# Patient Record
(demographics unavailable — no encounter records)

---

## 2024-10-14 NOTE — HISTORY OF PRESENT ILLNESS
[FreeTextEntry1] : Starting about 3-4 days ago, patient started  having hallucinations -seeing things in her room very confused and paranoid  recently stopped metformin and aricept and calcium which greatly resolved her appetite and GI problems  no n/v/d or constipation no fever, no shortness of breath, no chest pain no palpitations  does have lower back pain slightly worse takes tylenol in the am  recently started midodrine 2.5 mg tid due to orthostatic hypotension as per Dr. Ananda Dwyer - cardiologist [facility]

## 2024-10-14 NOTE — ASSESSMENT
[FreeTextEntry1] : take 2 tabs tylenol bid for back pain  still w orthostatic hypotension as measured in office by writer continue midodrine as per cardiologist  confusion hallucinations and paranoia may be progression of dementia but will check labs and urine today  f/u with pcp on 10/17 as scheduled

## 2024-10-14 NOTE — REASON FOR VISIT
[Acute] : an acute visit [Family Member] : family member [FreeTextEntry1] : confusion [FreeTextEntry3] : oral jaime

## 2024-10-17 NOTE — HISTORY OF PRESENT ILLNESS
[Patient reported osteoporosis screening was normal] : Patient reported osteoporosis screening was normal [Patient reported hearing was normal] : Patient reported hearing was normal [Patient reported vision is normal] : Patient reported vision is normal [Patient reported colon/rectal/cancer screening was normal] : Patient reported colon/rectal cancer screening was normal [Patient reported breast cancer screening was normal] : Patient reported breast cancer screening was normal [FreeTextEntry1] : Patient is a 81-year-old female with past medical history of mild dementia, A-fib, B12 deficiency, depression, CAD s/p 3 stents and a loop recorder, unintentional weight loss.  10/17/2024 Back Pain - Reports that the back pain seems to be getting less and improving - Experiences pain after physical therapy sessions - Pain is more manageable now compared to previous instances - Pain is not constant, goes through spurts - Pain seems to be a trigger for confusion episodes  Mood and Mental Health - Has been crying a lot and having days where she wakes up upset - Sleeps for about 10-11 hours, uses sleep as an escape from depression - Reports hallucinations and worsening confusion in the past couple of weeks - Reports feeling like she has been moved from three places and constantly looking for differences between them - Reports seeing things which are not there, such as a dog sitting next to her - Reports feeling really confused and disoriented, even in her own apartment - Reports feelings of paranoia and depression - Reports lack of relief when feeling uptight - Expresses feelings of hopelessness  Weight and Appetite - Weight has increased since last visit, currently weighs 111 lbs - Reports an increase in appetite  Medication and Treatment - Recently started on midodrine 2.5 mg three times a day for orthostatic hypotension, but no improvement noticed yet - Baby aspirin was recently stopped by cardiologist - metformin, calcium, and Aricept were stopped previously - Open to adding another antidepressant medication  Physical Therapy - Was attending physical therapy twice a week but stopped due to increased confusion and upset days - Finds physical therapy painful and not helpful - Interested in different types of therapy such as massage or bike exercises  Urinary Health - No burning sensation during urination - No increased frequency of urination    4AT score:  Burning urination: no Urine/fecal symptoms:  Medications: new midodrine withdrawl: no Pain: no; getting better Depression screen: no Sleep: less; 10-11hr Sensory deficits:   Level of activity:  Hydration: 32oz; not always Nutrition: increasing weight PE/MI:    Will check BMP,     09/06/2024 constipation: resolved ; taking miralax feels more depressed. tapered off fluoxetine; started lexapro 5mg 1 month ago.  she lives in Independent living.   eating very well now. now on 107lb. off donepezil, calcium, metformin. doing ensure once a day. balance is stil bit off. would try PT. hold off on memantine  seeing cardiologist next month. will discuss if aspirin can be discontinued.  orthostatic hypotension  aug 30,2024 Patient has not been feeling well since last 1 week not eating and drinking.  3 days ago she tested positive for COVID.  She also dropped off a urine sample yesterday as per daughter.  She has been feeling well better since yesterday afternoon and drinking 1 protein shake a day.  She had fever 100.3 on Monday but then it went away.  She also had runny nose which has gotten better.  Rarely has any cough but no sputum production.  Using Tylenol for headache and back pain. She has not had a bowel movement since Sunday.  Her labs from July showed a GFR of 41 and A1c of 6.2.  She is now off of fluoxetine and donepezil.  Has been taking Lexapro since last 1 week 5 mg daily.  SHOAIB HERBERT is a 81 year yo White female is coming in today to establish care. Patient has PMHx as listed below    #Depression long standing; was on fluoxetine 80mg and now on 60mg QD. 40 in AM and 20 in evening.  Sleeps 10-12hrs. always been a good sleeper.  #CAD s/p 3 stents with loop recorder on ASA + famotidine 20mg BID  #Afib on eliquis 2.5mg  BID, digoxin, not on metoprolol.  #B12 def on B12 3x a week  #Mild dementia on donepezil 5mg QD; will stop 2/2 weight loss  #unintentional weightloss started boost and ensure; using as needed.   did PT and stopped 1 month ago.    Education:[ ] Work:[ ] ETOH/Smoking:[ ] Weight loss/malnutrition:[ ] :[ ] Immunization: Screening: Depression screening: Medication reconciliation: Allergies:     4M Geriatric Screening Tests   Based on The 4Ms While Caring for Older Adults, an evidence-based focus on the key areas of mentation, mobility, medications, and what matters most (a guide by the [place] for [employer] and the [employer])     Medications: -Anticholinergic burden:[ ]     Mobility:   -Chronic pain:[ ] -Constipation: -Urinary symptoms: IPSS score: Is prostate >30cc?, PSA >1.5? sexually active?     Frail Scale: 0/5   -Are you fatigued?[ ] -Can you walk up one flight of stairs?[ ] -Can you walk one block?[ ] -Do you have more than 5 illnesses?[ ] -Have you lost more than 5% of your weight in last 6 months?[ ]       Mentation: -Hx of dementia:[ ] -h/o delirium:[ ] -Cognitive enhancing agents:[ ]       Sleep: STOP-BANG Snoring:[ ] Tiredness:[ ] Observed Apnea:[ ] Pressure: High blood pressure[ ] BMI: higher than 35.[ ] Age: older than 50[ ] Neck Circumference: greater than 16 inches is considered a risk factor.[ ] Gender: Males[ ]     Matters Most     [TextBox_25] : due [TextBox_37] : needs [TextBox_19] : done [FreeTextEntry4] : would defer for now

## 2024-10-17 NOTE — HISTORY OF PRESENT ILLNESS
[Patient reported osteoporosis screening was normal] : Patient reported osteoporosis screening was normal [Patient reported hearing was normal] : Patient reported hearing was normal [Patient reported vision is normal] : Patient reported vision is normal [Patient reported colon/rectal/cancer screening was normal] : Patient reported colon/rectal cancer screening was normal [Patient reported breast cancer screening was normal] : Patient reported breast cancer screening was normal [FreeTextEntry1] : Patient is a 81-year-old female with past medical history of mild dementia, A-fib, B12 deficiency, depression, CAD s/p 3 stents and a loop recorder, unintentional weight loss.  10/17/2024 Back Pain - Reports that the back pain seems to be getting less and improving - Experiences pain after physical therapy sessions - Pain is more manageable now compared to previous instances - Pain is not constant, goes through spurts - Pain seems to be a trigger for confusion episodes  Mood and Mental Health - Has been crying a lot and having days where she wakes up upset - Sleeps for about 10-11 hours, uses sleep as an escape from depression - Reports hallucinations and worsening confusion in the past couple of weeks - Reports feeling like she has been moved from three places and constantly looking for differences between them - Reports seeing things which are not there, such as a dog sitting next to her - Reports feeling really confused and disoriented, even in her own apartment - Reports feelings of paranoia and depression - Reports lack of relief when feeling uptight - Expresses feelings of hopelessness  Weight and Appetite - Weight has increased since last visit, currently weighs 111 lbs - Reports an increase in appetite  Medication and Treatment - Recently started on midodrine 2.5 mg three times a day for orthostatic hypotension, but no improvement noticed yet - Baby aspirin was recently stopped by cardiologist - metformin, calcium, and Aricept were stopped previously - Open to adding another antidepressant medication  Physical Therapy - Was attending physical therapy twice a week but stopped due to increased confusion and upset days - Finds physical therapy painful and not helpful - Interested in different types of therapy such as massage or bike exercises  Urinary Health - No burning sensation during urination - No increased frequency of urination    4AT score:  Burning urination: no Urine/fecal symptoms:  Medications: new midodrine withdrawl: no Pain: no; getting better Depression screen: no Sleep: less; 10-11hr Sensory deficits:   Level of activity:  Hydration: 32oz; not always Nutrition: increasing weight PE/MI:    Will check BMP,     09/06/2024 constipation: resolved ; taking miralax feels more depressed. tapered off fluoxetine; started lexapro 5mg 1 month ago.  she lives in Independent living.   eating very well now. now on 107lb. off donepezil, calcium, metformin. doing ensure once a day. balance is stil bit off. would try PT. hold off on memantine  seeing cardiologist next month. will discuss if aspirin can be discontinued.  orthostatic hypotension  aug 30,2024 Patient has not been feeling well since last 1 week not eating and drinking.  3 days ago she tested positive for COVID.  She also dropped off a urine sample yesterday as per daughter.  She has been feeling well better since yesterday afternoon and drinking 1 protein shake a day.  She had fever 100.3 on Monday but then it went away.  She also had runny nose which has gotten better.  Rarely has any cough but no sputum production.  Using Tylenol for headache and back pain. She has not had a bowel movement since Sunday.  Her labs from July showed a GFR of 41 and A1c of 6.2.  She is now off of fluoxetine and donepezil.  Has been taking Lexapro since last 1 week 5 mg daily.  SHOAIB HERBERT is a 81 year yo White female is coming in today to establish care. Patient has PMHx as listed below    #Depression long standing; was on fluoxetine 80mg and now on 60mg QD. 40 in AM and 20 in evening.  Sleeps 10-12hrs. always been a good sleeper.  #CAD s/p 3 stents with loop recorder on ASA + famotidine 20mg BID  #Afib on eliquis 2.5mg  BID, digoxin, not on metoprolol.  #B12 def on B12 3x a week  #Mild dementia on donepezil 5mg QD; will stop 2/2 weight loss  #unintentional weightloss started boost and ensure; using as needed.   did PT and stopped 1 month ago.    Education:[ ] Work:[ ] ETOH/Smoking:[ ] Weight loss/malnutrition:[ ] :[ ] Immunization: Screening: Depression screening: Medication reconciliation: Allergies:     4M Geriatric Screening Tests   Based on The 4Ms While Caring for Older Adults, an evidence-based focus on the key areas of mentation, mobility, medications, and what matters most (a guide by the [place] [employer] and the [employer] [place])     Medications: -Anticholinergic burden:[ ]     Mobility:   -Chronic pain:[ ] -Constipation: -Urinary symptoms: IPSS score: Is prostate >30cc?, PSA >1.5? sexually active?     Frail Scale: 0/5   -Are you fatigued?[ ] -Can you walk up one flight of stairs?[ ] -Can you walk one block?[ ] -Do you have more than 5 illnesses?[ ] -Have you lost more than 5% of your weight in last 6 months?[ ]       Mentation: -Hx of dementia:[ ] -h/o delirium:[ ] -Cognitive enhancing agents:[ ]       Sleep: STOP-BANG Snoring:[ ] Tiredness:[ ] Observed Apnea:[ ] Pressure: High blood pressure[ ] BMI: higher than 35.[ ] Age: older than 50[ ] Neck Circumference: greater than 16 inches is considered a risk factor.[ ] Gender: Males[ ]     Matters Most     [TextBox_25] : due [TextBox_37] : needs [TextBox_19] : done [FreeTextEntry4] : would defer for now

## 2024-10-21 NOTE — HISTORY OF PRESENT ILLNESS
[FreeTextEntry1] : Lives with spouse at the [facility] in [place] in the independent living and requires assistance with ADLs and IADLs currently applying for medicaid and requires paperwork  [2] : 2) Feeling down, depressed, or hopeless for more than half of the days (2) [I have developed a follow-up plan documented below in the note.] : I have developed a follow-up plan documented below in the note. [[id number]] : 4

## 2024-10-21 NOTE — PHYSICAL EXAM
[Alert] : alert [No Acute Distress] : in no acute distress [Normal Outer Ear/Nose] : the ears and nose were normal in appearance [Normal Appearance] : the appearance of the neck was normal [Supple] : the neck was supple [No Respiratory Distress] : no respiratory distress [No Acc Muscle Use] : no accessory muscle use [Respiration, Rhythm And Depth] : normal respiratory rhythm and effort [Auscultation Breath Sounds / Voice Sounds] : lungs were clear to auscultation bilaterally [Heart Rate And Rhythm] : heart rate was normal and rhythm regular [Normal Color / Pigmentation] : normal skin color and pigmentation [Normal Turgor] : normal skin turgor [No Focal Deficits] : no focal deficits [Normal S1, S2] : normal S1 and S2 [Motor Exam] : the motor exam was normal

## 2024-10-21 NOTE — REVIEW OF SYSTEMS
[Constipation] : constipation [Negative] : Musculoskeletal [FreeTextEntry2] : dizziness when she stands up [FreeTextEntry7] : miralax as needed

## 2024-10-21 NOTE — ASSESSMENT
[FreeTextEntry1] : still feeling sadness but no SI due to confusion and changes has not started Sertraline yet; will start today lives with spouse very supportive daughter  Filled out forms as requested for application to Medicaid pt requires assistance with many activities due to dementia,  orthostatic hypotension and history of/risk for falls

## 2025-01-14 NOTE — HISTORY OF PRESENT ILLNESS
[Home] : at home, [unfilled] , at the time of the visit. [Medical Office: ([facility])___] : at the medical office located in  [Family Member] : family member [FreeTextEntry3] : Daughter Hayde [FreeTextEntry1] : Patient is a 81-year-old female with past medical history of mild dementia, A-fib, B12 deficiency, depression, CAD s/p 3 stents and a loop recorder, unintentional weight loss. 01/14/2024   - History of Present Illness: Karime, an elderly female patient, has been experiencing increasingly severe delusions and sleep disturbances. She has been living in independent living at [facility] for two years. Recently, her symptoms have worsened significantly. She is up all night, confused about her location, paranoid, and angry. She believes she is being moved to different warehouses nightly and that people are stealing her belongings. The patient now requires 24-hour care due to the severity of her symptoms. Rexulti was previously prescribed but has not shown significant improvement. Initially, it caused morning drowsiness and dry mouth. The patient experienced a fall resulting in two broken ribs while on Rexulti. It is also very expensive.  Sleep patterns have deteriorated, with the patient only getting short periods of sleep (about an hour at a time) throughout the night. The patient has become hyper due to lack of sleep, which is unusual as she typically napped frequently during the day.      - Miralax three times a week for constipation management  12/05/2024 Confusion and memory issues - Experiences confusion and difficulty with memory, including trouble finding words and using incorrect words. - Episodes of confusion have been on and off, with increased frequency this week. - Does not remember episodes of confusion. - Underwent memory testing during the visit.  Orthostatic hypotension - Experiences orthostatic hypotension, with significant drops in blood pressure. - Currently taking midodrine, but it does not seem effective.  Mood and emotional state - Mood has improved with medication adjustments, less emotional and calmer in the past week or two. - Aware of confusion, which is upsetting. - Does not feel depressed currently, but gets overwhelmed easily.  Vision problems - Occasionally experiences cloudy vision in the left eye. - Last eye exam was two months ago (October 2024), which showed longstanding diabetes and heart issues but otherwise good eye health.  Physical and occupational therapy - Attends physical therapy for balance and strengthening. - Participates in occupational therapy, focusing on upper body and fine motor skills.  Living situation and daily assistance - Resides at [facility] with her . - Has a private pay aide seven days a week for assistance with daily activities due to instability and confusion.  Falls and balance issues - Has experienced falls, including a recent fall by the window, but has not been injured. Advanced care planning - Has not completed the pink form for advanced care planning. - Has legal documents for a living will but needs to complete healthcare proxy forms.  Cognitive careplan checklist summary: [facility] Memory assessment: 5/30 ADLs/IADLs: done PHQ-2, NPI-Q: done Med rec: done Home Safety: done Caregiver assessment: done ACP documentation: will fill at next visit FAST score: 6d Decision-making capacity: not intact for complex decisions  doing PT and OT          10/17/2024 Back Pain - Reports that the back pain seems to be getting less and improving - Experiences pain after physical therapy sessions - Pain is more manageable now compared to previous instances - Pain is not constant, goes through spurts - Pain seems to be a trigger for confusion episodes  Mood and Mental Health - Has been crying a lot and having days where she wakes up upset - Sleeps for about 10-11 hours, uses sleep as an escape from depression - Reports hallucinations and worsening confusion in the past couple of weeks - Reports feeling like she has been moved from three places and constantly looking for differences between them - Reports seeing things which are not there, such as a dog sitting next to her - Reports feeling really confused and disoriented, even in her own apartment - Reports feelings of paranoia and depression - Reports lack of relief when feeling uptight - Expresses feelings of hopelessness  Weight and Appetite - Weight has increased since last visit, currently weighs 111 lbs - Reports an increase in appetite  Medication and Treatment - Recently started on midodrine 2.5 mg three times a day for orthostatic hypotension, but no improvement noticed yet - Baby aspirin was recently stopped by cardiologist - metformin, calcium, and Aricept were stopped previously - Open to adding another antidepressant medication  Physical Therapy - Was attending physical therapy twice a week but stopped due to increased confusion and upset days - Finds physical therapy painful and not helpful - Interested in different types of therapy such as massage or bike exercises  Urinary Health - No burning sensation during urination - No increased frequency of urination    4AT score:  Burning urination: no Urine/fecal symptoms:  Medications: new midodrine withdrawl: no Pain: no; getting better Depression screen: no Sleep: less; 10-11hr Sensory deficits:   Level of activity:  Hydration: 32oz; not always Nutrition: increasing weight PE/MI:    Will check BMP,     09/06/2024 constipation: resolved ; taking miralax feels more depressed. tapered off fluoxetine; started lexapro 5mg 1 month ago.  she lives in Independent living.   eating very well now. now on 107lb. off donepezil, calcium, metformin. doing ensure once a day. balance is stil bit off. would try PT. hold off on memantine  seeing cardiologist next month. will discuss if aspirin can be discontinued.  orthostatic hypotension  aug 30,2024 Patient has not been feeling well since last 1 week not eating and drinking.  3 days ago she tested positive for COVID.  She also dropped off a urine sample yesterday as per daughter.  She has been feeling well better since yesterday afternoon and drinking 1 protein shake a day.  She had fever 100.3 on Monday but then it went away.  She also had runny nose which has gotten better.  Rarely has any cough but no sputum production.  Using Tylenol for headache and back pain. She has not had a bowel movement since Sunday.  Her labs from July showed a GFR of 41 and A1c of 6.2.  She is now off of fluoxetine and donepezil.  Has been taking Lexapro since last 1 week 5 mg daily.  SHOAIB HERBERT is a 81 year yo White female is coming in today to establish care. Patient has PMHx as listed below    #Depression long standing; was on fluoxetine 80mg and now on 60mg QD. 40 in AM and 20 in evening.  Sleeps 10-12hrs. always been a good sleeper.  #CAD s/p 3 stents with loop recorder on ASA + famotidine 20mg BID  #Afib on eliquis 2.5mg  BID, digoxin, not on metoprolol.  #B12 def on B12 3x a week  #Mild dementia on donepezil 5mg QD; will stop 2/2 weight loss  #unintentional weightloss started boost and ensure; using as needed.   did PT and stopped 1 month ago.    Education:[ ] Work:[ ] ETOH/Smoking:[ ] Weight loss/malnutrition:[ ] :[ ] Immunization: Screening: Depression screening: Medication reconciliation: Allergies:     4M Geriatric Screening Tests   Based on The 4Ms While Caring for Older Adults, an evidence-based focus on the key areas of mentation, mobility, medications, and what matters most (a guide by the [place] for Healthcare [employer] and the [employer] [place] [employer])     Medications: -Anticholinergic burden:[ ]     Mobility:   -Chronic pain:[ ] -Constipation: -Urinary symptoms: IPSS score: Is prostate >30cc?, PSA >1.5? sexually active?     Frail Scale: 0/5   -Are you fatigued?[ ] -Can you walk up one flight of stairs?[ ] -Can you walk one block?[ ] -Do you have more than 5 illnesses?[ ] -Have you lost more than 5% of your weight in last 6 months?[ ]       Mentation: -Hx of dementia:[ ] -h/o delirium:[ ] -Cognitive enhancing agents:[ ]       Sleep: STOP-BANG Snoring:[ ] Tiredness:[ ] Observed Apnea:[ ] Pressure: High blood pressure[ ] BMI: higher than 35.[ ] Age: older than 50[ ] Neck Circumference: greater than 16 inches is considered a risk factor.[ ] Gender: Males[ ]     Matters Most

## 2025-02-06 NOTE — HISTORY OF PRESENT ILLNESS
[FreeTextEntry1] : Patient is a 81-year-old female with past medical history of mild dementia, A-fib, B12 deficiency, depression, CAD s/p 3 stents and a loop recorder, unintentional weight loss. 02/06/2025 history of recent stroke, presents with a sudden onset of confusion and decreased responsiveness. The symptoms began two days ago, with the patient experiencing difficulty in talking and increased confusion. The condition worsened significantly over the past 24 hours, with the patient becoming almost non-verbal and appearing uncomfortable. The patient's daughter reports that while there has been a gradual decline in her mother's condition, this acute change is unprecedented. The patient had an MRI last Saturday that revealed a small stroke, following a blood clot in her eye the week prior. Since then, her medication regimen was adjusted to include Eliquis 2.5 mg twice daily and a daily baby aspirin. The patient takes Seroquel 25 mg at night for management of confusion and agitation. There is no reported fever, but the patient feels cold to touch. The daughter also mentions a single instance of noticing a small amount of bright red blood on toilet paper after the patient used the bathroom on Saturday, but no recurrence since then.   01/14/2024   - History of Present Illness: Karime, an elderly female patient, has been experiencing increasingly severe delusions and sleep disturbances. She has been living in independent living at [place] for two years. Recently, her symptoms have worsened significantly. She is up all night, confused about her location, paranoid, and angry. She believes she is being moved to different warehouses nightly and that people are stealing her belongings. The patient now requires 24-hour care due to the severity of her symptoms. Rexulti was previously prescribed but has not shown significant improvement. Initially, it caused morning drowsiness and dry mouth. The patient experienced a fall resulting in two broken ribs while on Rexulti. It is also very expensive.  Sleep patterns have deteriorated, with the patient only getting short periods of sleep (about an hour at a time) throughout the night. The patient has become hyper due to lack of sleep, which is unusual as she typically napped frequently during the day.      - Miralax three times a week for constipation management  12/05/2024 Confusion and memory issues - Experiences confusion and difficulty with memory, including trouble finding words and using incorrect words. - Episodes of confusion have been on and off, with increased frequency this week. - Does not remember episodes of confusion. - Underwent memory testing during the visit.  Orthostatic hypotension - Experiences orthostatic hypotension, with significant drops in blood pressure. - Currently taking midodrine, but it does not seem effective.  Mood and emotional state - Mood has improved with medication adjustments, less emotional and calmer in the past week or two. - Aware of confusion, which is upsetting. - Does not feel depressed currently, but gets overwhelmed easily.  Vision problems - Occasionally experiences cloudy vision in the left eye. - Last eye exam was two months ago (October 2024), which showed longstanding diabetes and heart issues but otherwise good eye health.  Physical and occupational therapy - Attends physical therapy for balance and strengthening. - Participates in occupational therapy, focusing on upper body and fine motor skills.  Living situation and daily assistance - Resides at [facility] with her . - Has a private pay aide seven days a week for assistance with daily activities due to instability and confusion.  Falls and balance issues - Has experienced falls, including a recent fall by the window, but has not been injured. Advanced care planning - Has not completed the pink form for advanced care planning. - Has legal documents for a living will but needs to complete healthcare proxy forms.  Cognitive careplan checklist summary: [facility] Memory assessment: 5/30 ADLs/IADLs: done PHQ-2, NPI-Q: done Med rec: done Home Safety: done Caregiver assessment: done ACP documentation: will fill at next visit FAST score: 6d Decision-making capacity: not intact for complex decisions  doing PT and OT          10/17/2024 Back Pain - Reports that the back pain seems to be getting less and improving - Experiences pain after physical therapy sessions - Pain is more manageable now compared to previous instances - Pain is not constant, goes through spurts - Pain seems to be a trigger for confusion episodes  Mood and Mental Health - Has been crying a lot and having days where she wakes up upset - Sleeps for about 10-11 hours, uses sleep as an escape from depression - Reports hallucinations and worsening confusion in the past couple of weeks - Reports feeling like she has been moved from three places and constantly looking for differences between them - Reports seeing things which are not there, such as a dog sitting next to her - Reports feeling really confused and disoriented, even in her own apartment - Reports feelings of paranoia and depression - Reports lack of relief when feeling uptight - Expresses feelings of hopelessness  Weight and Appetite - Weight has increased since last visit, currently weighs 111 lbs - Reports an increase in appetite  Medication and Treatment - Recently started on midodrine 2.5 mg three times a day for orthostatic hypotension, but no improvement noticed yet - Baby aspirin was recently stopped by cardiologist - metformin, calcium, and Aricept were stopped previously - Open to adding another antidepressant medication  Physical Therapy - Was attending physical therapy twice a week but stopped due to increased confusion and upset days - Finds physical therapy painful and not helpful - Interested in different types of therapy such as massage or bike exercises  Urinary Health - No burning sensation during urination - No increased frequency of urination    4AT score:  Burning urination: no Urine/fecal symptoms:  Medications: new midodrine withdrawl: no Pain: no; getting better Depression screen: no Sleep: less; 10-11hr Sensory deficits:   Level of activity:  Hydration: 32oz; not always Nutrition: increasing weight PE/MI:    Will check BMP,     09/06/2024 constipation: resolved ; taking miralax feels more depressed. tapered off fluoxetine; started lexapro 5mg 1 month ago.  she lives in Independent living.   eating very well now. now on 107lb. off donepezil, calcium, metformin. doing ensure once a day. balance is stil bit off. would try PT. hold off on memantine  seeing cardiologist next month. will discuss if aspirin can be discontinued.  orthostatic hypotension  aug 30,2024 Patient has not been feeling well since last 1 week not eating and drinking.  3 days ago she tested positive for COVID.  She also dropped off a urine sample yesterday as per daughter.  She has been feeling well better since yesterday afternoon and drinking 1 protein shake a day.  She had fever 100.3 on Monday but then it went away.  She also had runny nose which has gotten better.  Rarely has any cough but no sputum production.  Using Tylenol for headache and back pain. She has not had a bowel movement since Sunday.  Her labs from July showed a GFR of 41 and A1c of 6.2.  She is now off of fluoxetine and donepezil.  Has been taking Lexapro since last 1 week 5 mg daily.  SHOAIB HERBERT is a 81 year yo White female is coming in today to establish care. Patient has PMHx as listed below    #Depression long standing; was on fluoxetine 80mg and now on 60mg QD. 40 in AM and 20 in evening.  Sleeps 10-12hrs. always been a good sleeper.  #CAD s/p 3 stents with loop recorder on ASA + famotidine 20mg BID  #Afib on eliquis 2.5mg  BID, digoxin, not on metoprolol.  #B12 def on B12 3x a week  #Mild dementia on donepezil 5mg QD; will stop 2/2 weight loss  #unintentional weightloss started boost and ensure; using as needed.   did PT and stopped 1 month ago.    Education:[ ] Work:[ ] ETOH/Smoking:[ ] Weight loss/malnutrition:[ ] :[ ] Immunization: Screening: Depression screening: Medication reconciliation: Allergies:     4M Geriatric Screening Tests   Based on The 4Ms While Caring for Older Adults, an evidence-based focus on the key areas of mentation, mobility, medications, and what matters most (a guide by the [place] for Healthcare Improvement and the [employer])     Medications: -Anticholinergic burden:[ ]     Mobility:   -Chronic pain:[ ] -Constipation: -Urinary symptoms: IPSS score: Is prostate >30cc?, PSA >1.5? sexually active?     Frail Scale: 0/5   -Are you fatigued?[ ] -Can you walk up one flight of stairs?[ ] -Can you walk one block?[ ] -Do you have more than 5 illnesses?[ ] -Have you lost more than 5% of your weight in last 6 months?[ ]       Mentation: -Hx of dementia:[ ] -h/o delirium:[ ] -Cognitive enhancing agents:[ ]       Sleep: STOP-BANG Snoring:[ ] Tiredness:[ ] Observed Apnea:[ ] Pressure: High blood pressure[ ] BMI: higher than 35.[ ] Age: older than 50[ ] Neck Circumference: greater than 16 inches is considered a risk factor.[ ] Gender: Males[ ]     Matters Most

## 2025-03-06 NOTE — HISTORY OF PRESENT ILLNESS
[FreeTextEntry1] : Patient is a 81-year-old female with past medical history of mild dementia, A-fib, B12 deficiency, depression, CAD s/p 3 stents and a loop recorder, unintentional weight loss. 03/06/2025   - History of Present Illness: The patient is an elderly woman who was recently hospitalized for a urinary tract infection and dehydration. She was admitted to the ER due to altered mental status. During her hospital stay, she was diagnosed with a mild UTI and dehydration. She was treated with antibiotics (possibly Levaquin) and IV fluids. The patient's medications were adjusted during her hospital stay. She was discharged after a few days and has been doing well at home. The patient reports feeling much better since her hospitalization. She has a new live-in aide who is working out well. The patient's daughter reports that her mother's behavior has significantly improved since the treatment.    - Past Medical History: The patient has a history of orthostatic hypotension, for which she was previously prescribed midodrine. She also has a history of mood issues, for which she takes Seroquel and Lexapro. The patient recently had a stroke, which resulted in some vision impairment in her left eye. She has been seeing a retinal specialist for this issue and has shown significant improvement in her vision.  02/06/2025 history of recent stroke, presents with a sudden onset of confusion and decreased responsiveness. The symptoms began two days ago, with the patient experiencing difficulty in talking and increased confusion. The condition worsened significantly over the past 24 hours, with the patient becoming almost non-verbal and appearing uncomfortable. The patient's daughter reports that while there has been a gradual decline in her mother's condition, this acute change is unprecedented. The patient had an MRI last Saturday that revealed a small stroke, following a blood clot in her eye the week prior. Since then, her medication regimen was adjusted to include Eliquis 2.5 mg twice daily and a daily baby aspirin. The patient takes Seroquel 25 mg at night for management of confusion and agitation. There is no reported fever, but the patient feels cold to touch. The daughter also mentions a single instance of noticing a small amount of bright red blood on toilet paper after the patient used the bathroom on Saturday, but no recurrence since then.   01/14/2024   - History of Present Illness: Karime, an elderly female patient, has been experiencing increasingly severe delusions and sleep disturbances. She has been living in independent living at [facility] for two years. Recently, her symptoms have worsened significantly. She is up all night, confused about her location, paranoid, and angry. She believes she is being moved to different warehouses nightly and that people are stealing her belongings. The patient now requires 24-hour care due to the severity of her symptoms. Rexulti was previously prescribed but has not shown significant improvement. Initially, it caused morning drowsiness and dry mouth. The patient experienced a fall resulting in two broken ribs while on Rexulti. It is also very expensive.  Sleep patterns have deteriorated, with the patient only getting short periods of sleep (about an hour at a time) throughout the night. The patient has become hyper due to lack of sleep, which is unusual as she typically napped frequently during the day.      - Miralax three times a week for constipation management  12/05/2024 Confusion and memory issues - Experiences confusion and difficulty with memory, including trouble finding words and using incorrect words. - Episodes of confusion have been on and off, with increased frequency this week. - Does not remember episodes of confusion. - Underwent memory testing during the visit.  Orthostatic hypotension - Experiences orthostatic hypotension, with significant drops in blood pressure. - Currently taking midodrine, but it does not seem effective.  Mood and emotional state - Mood has improved with medication adjustments, less emotional and calmer in the past week or two. - Aware of confusion, which is upsetting. - Does not feel depressed currently, but gets overwhelmed easily.  Vision problems - Occasionally experiences cloudy vision in the left eye. - Last eye exam was two months ago (October 2024), which showed longstanding diabetes and heart issues but otherwise good eye health.  Physical and occupational therapy - Attends physical therapy for balance and strengthening. - Participates in occupational therapy, focusing on upper body and fine motor skills.  Living situation and daily assistance - Resides at [facility] with her . - Has a private pay aide seven days a week for assistance with daily activities due to instability and confusion.  Falls and balance issues - Has experienced falls, including a recent fall by the window, but has not been injured. Advanced care planning - Has not completed the pink form for advanced care planning. - Has legal documents for a living will but needs to complete healthcare proxy forms.  Cognitive careplan checklist summary: [facility] Memory assessment: 5/30 ADLs/IADLs: done PHQ-2, NPI-Q: done Med rec: done Home Safety: done Caregiver assessment: done ACP documentation: will fill at next visit FAST score: 6d Decision-making capacity: not intact for complex decisions  doing PT and OT          10/17/2024 Back Pain - Reports that the back pain seems to be getting less and improving - Experiences pain after physical therapy sessions - Pain is more manageable now compared to previous instances - Pain is not constant, goes through spurts - Pain seems to be a trigger for confusion episodes  Mood and Mental Health - Has been crying a lot and having days where she wakes up upset - Sleeps for about 10-11 hours, uses sleep as an escape from depression - Reports hallucinations and worsening confusion in the past couple of weeks - Reports feeling like she has been moved from three places and constantly looking for differences between them - Reports seeing things which are not there, such as a dog sitting next to her - Reports feeling really confused and disoriented, even in her own apartment - Reports feelings of paranoia and depression - Reports lack of relief when feeling uptight - Expresses feelings of hopelessness  Weight and Appetite - Weight has increased since last visit, currently weighs 111 lbs - Reports an increase in appetite  Medication and Treatment - Recently started on midodrine 2.5 mg three times a day for orthostatic hypotension, but no improvement noticed yet - Baby aspirin was recently stopped by cardiologist - metformin, calcium, and Aricept were stopped previously - Open to adding another antidepressant medication  Physical Therapy - Was attending physical therapy twice a week but stopped due to increased confusion and upset days - Finds physical therapy painful and not helpful - Interested in different types of therapy such as massage or bike exercises  Urinary Health - No burning sensation during urination - No increased frequency of urination    4AT score:  Burning urination: no Urine/fecal symptoms:  Medications: new midodrine withdrawl: no Pain: no; getting better Depression screen: no Sleep: less; 10-11hr Sensory deficits:   Level of activity:  Hydration: 32oz; not always Nutrition: increasing weight PE/MI:    Will check BMP,     09/06/2024 constipation: resolved ; taking miralax feels more depressed. tapered off fluoxetine; started lexapro 5mg 1 month ago.  she lives in Independent living.   eating very well now. now on 107lb. off donepezil, calcium, metformin. doing ensure once a day. balance is stil bit off. would try PT. hold off on memantine  seeing cardiologist next month. will discuss if aspirin can be discontinued.  orthostatic hypotension  aug 30,2024 Patient has not been feeling well since last 1 week not eating and drinking.  3 days ago she tested positive for COVID.  She also dropped off a urine sample yesterday as per daughter.  She has been feeling well better since yesterday afternoon and drinking 1 protein shake a day.  She had fever 100.3 on Monday but then it went away.  She also had runny nose which has gotten better.  Rarely has any cough but no sputum production.  Using Tylenol for headache and back pain. She has not had a bowel movement since Sunday.  Her labs from July showed a GFR of 41 and A1c of 6.2.  She is now off of fluoxetine and donepezil.  Has been taking Lexapro since last 1 week 5 mg daily.  SHOAIB HERBERT is a 81 year yo White female is coming in today to establish care. Patient has PMHx as listed below    #Depression long standing; was on fluoxetine 80mg and now on 60mg QD. 40 in AM and 20 in evening.  Sleeps 10-12hrs. always been a good sleeper.  #CAD s/p 3 stents with loop recorder on ASA + famotidine 20mg BID  #Afib on eliquis 2.5mg  BID, digoxin, not on metoprolol.  #B12 def on B12 3x a week  #Mild dementia on donepezil 5mg QD; will stop 2/2 weight loss  #unintentional weightloss started boost and ensure; using as needed.   did PT and stopped 1 month ago.    Education:[ ] Work:[ ] ETOH/Smoking:[ ] Weight loss/malnutrition:[ ] :[ ] Immunization: Screening: Depression screening: Medication reconciliation: Allergies:     4M Geriatric Screening Tests   Based on The 4Ms While Caring for Older Adults, an evidence-based focus on the key areas of mentation, mobility, medications, and what matters most (a guide by the [place] for Healthcare Improvement and the [employer])     Medications: -Anticholinergic burden:[ ]     Mobility:   -Chronic pain:[ ] -Constipation: -Urinary symptoms: IPSS score: Is prostate >30cc?, PSA >1.5? sexually active?     Frail Scale: 0/5   -Are you fatigued?[ ] -Can you walk up one flight of stairs?[ ] -Can you walk one block?[ ] -Do you have more than 5 illnesses?[ ] -Have you lost more than 5% of your weight in last 6 months?[ ]       Mentation: -Hx of dementia:[ ] -h/o delirium:[ ] -Cognitive enhancing agents:[ ]       Sleep: STOP-BANG Snoring:[ ] Tiredness:[ ] Observed Apnea:[ ] Pressure: High blood pressure[ ] BMI: higher than 35.[ ] Age: older than 50[ ] Neck Circumference: greater than 16 inches is considered a risk factor.[ ] Gender: Males[ ]     Matters Most

## 2025-03-26 NOTE — PHYSICAL EXAM
[FreeTextEntry1] : Mental Status: Mood appropriate Gives some history. Hayde dtr provides most of the history. 6/6/2024 MOCA 18/30 Knows age. States that the year is 1982. Knows she is in a "medical center". Can follow simple commands.  3/3 registration of three items , 2/3 recall at three minutes  Language/Speech : speech fluent Cranial Nerves II: Pupils equal and reactive, VFF full III, IV, VI: EOMI V : normal sensation in V1-V3 b/l VII : no asymmetry, no nasolabial fold flattening VIII: normal hearing to voice IX, X: normal palatal elevation, uvula midline XI: 5/5 head turn and 5/5 shoulder shrug bilaterally XII : midline tongue protrusion Motor: no drift in limbs, normal bulk and tone, 5-/5 in all extremities Sensory: intact to light touch Gait: Forward leaning, wide based, unsteady gait with no AD. Ambulating today with rollator.

## 2025-03-26 NOTE — DISCUSSION/SUMMARY
[FreeTextEntry1] : Suma is a pleasant 81-year-old woman with history of atrial fibrillation, CAD, htn, lumbar spinal stenosis, diabetes mellitus presenting with frequent falls. Does have evidence of orthostatic hypotension. Some unsteadiness is related to lumbar spinal stenosis. Given comorbidities, on eliquis, unclear if she is a surgical candidate.  She could consider compression stockings, physical therapy - and agree with consideration of Watchman given high fall risk on eliquis.  Also with worsening cognitive function. Will do formal cognitive testing. MOCA 6/2024 18/30. Off donepezil due to weight loss.   Had a small incidental likely left temporal embolic appearing stroke 1/2025 with no clear deficits on exam. Had left CRAO and had dopplers?  without critical stenosis. Did have episode of CRAO that resolved ?  On aspirin and eliquis 2.5 mg bid. Did have recent admission February for a couple days for altered mental status. Do think this was in setting of hypertensive emergency (191/151).

## 2025-03-26 NOTE — HISTORY OF PRESENT ILLNESS
[FreeTextEntry1] : Last seen in clinic on 6/6/24. Had two falls with 2 broken ribs in December. Had loss of vision in left eye with ? of central retinal artery occlusion with complete blackening followed by improvement in vision. Had a very small stroke on MRI imaging, left temporal.  On eliquis 2.5 mg bid and a baby aspirin. Then had a recent hospitalization for a UTI in February, 2/6-2/8 for altered mental status. Of note, her blood pressure was very high. Up to 231/102. Was on midodrine for low blood pressure. This was stopped. Suspect this was hypertensive emergency rather than the UTI that caused her symptoms.   Since then, donepezil and metformin had been stopped due to weight loss.   She lives in [facility], independent living. Has two live in aids.  She is receiving PT and OT. She uses rolling walker. Needs a new one. Has a fall alert necklace.  Receiving 100 mg of seroquel at night to help with sleep.  ____________________________________________________________________ 9/6/24  Last seen in clinic on 2/6/24. Doing okay. Ambulating with walker. Here to review results of MRI's. In general, no pain in her back. Just doesn't feel as steady on her feet. Tends to have to keep head down, feels less dizzy. No falling. Dizziness is worse upon standing. Drifts to one side or the other. Daughter will notice she shakes slightly and feels as if she is spinning. Wondering if this is vestibular? Did physical therapy, no clear benefit. During episodes of dizziness, checked loop recorder, did not appear to be cardiac. Has an appointment with Dr. Anthony to discuss spinal stenosis.   Daughter is considering since she is such a high fall risk - watchman procedure as opposed to eliquis.    _______________________ 2/6/24  Araceli is a 80-year-old right-handed woman with a past medical history of lumbar spinal stenosis, atrial fibrillation, CAD w/ three stents, type II diabetes, depression and hyperlipidemia.  She is presenting to clinic with daughter due to frequent falls. She states that she just falls without warning. No preceding chest pain. Not tripping on something. No dizziness. Sometimes if standing for too long, feels as if she needs to sit down.  Falls in stores. Last year, she hurt her knee and wrist. She goes forward with her upper body while rest of body appears to remain frozen. Daughter and mom want to know if they might be missing something so brought her to neurology.  She does have a loop recorder and they are looking for arrhythmias. Metoprolol was stopped due to bradycardia. Overall, she is doing relatively well. She has lost some weight for unclear reasons.   With regard to spinal stenosis, there is no pain.   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

## 2025-03-26 NOTE — DATA REVIEWED
[de-identified] : 1/2025: MRI brain: new punctate acute/subacute infarct in left posterior temporal region. Chronic ischemic changes . 4/2024 MRI brain:   BRAIN PARENCHYMAL VOLUME LOSS, PARTICULARLY PRONOUNCED IN THE MESIAL TEMPORAL LOBES.   3D VOLUMETRIC ASSESSMENT SUGGESTS SLIGHTLY LOW HIPPOCAMPAL VOLUME LOSS, AT 38TH PERCENTILE FOR AGE (LEFT HIPPOCAMPUS: 43RD PERCENTILE, RIGHT HIPPOCAMPUS: 34TH PERCENTILE).   IF THERE IS CONCERN FOR NEURODEGENERATIVE DISEASE, FDG-PET MAY BE PERFORMED TO EVALUATE FOR DIFFERENTIAL METABOLIC PATTERN OF NEURODEGENERATIVE DISORDERS. 2/5/2024: MRI brain: no intracranial mass, acute territorial infarct, intracranial hemorrhage or shift.  [de-identified] : 2/8/25 creatinine 1.2, wbc 11.87 high, INR 1.3  2/5/24: MRI lumbar spine: multilevel lumbar spondylosis  with S-shaped curvature and multilevel canal and foraminal stenosis - severe central canal stenosis at L2-L3 and L4-L4 and moderate canal stenosis at L3-L4. Severe right neural foraminal stenosis L2-3 and L3-4 and severe bilateral neural foraminal stenosis at L4-L5.

## 2025-03-26 NOTE — ASSESSMENT
[FreeTextEntry1] : Can hold off on memantine for now (given possible side-effects) and recent hospitalization Continue statin, continue aspirin, continue eliquis continue physical therapy Prescription for rolling walker Monitor BP, off midodrine (was hypertensive to 232/102) RTC in 6 months to see Dr. Lin or ACP  For brain health   - healthy eating/diet for memory cognition with diet rich in fiber, fruits and vegetables and low in saturated fats, processed foods.  - good sleep, 7-8 hours a night. No use of phone or watching television before bed.  - aerobic exercise, at least 30 minutes, such as a brisk walk 3-4 times a week.  - keep mind active with puzzles, reading , socializing with others.  - abstaining from excess alcohol, drug use.  - blood pressure and cholesterol monitoring , blood glucose monitoring to prevent microvascular disease which can lead to cognitive impairment.

## 2025-03-26 NOTE — HISTORY OF PRESENT ILLNESS
[FreeTextEntry1] : Last seen in clinic on 6/6/24. Had two falls with 2 broken ribs in December. Had loss of vision in left eye with ? of central retinal artery occlusion with complete blackening followed by improvement in vision. Had a very small stroke on MRI imaging, left temporal.  On eliquis 2.5 mg bid and a baby aspirin. Then had a recent hospitalization for a UTI in February, 2/6-2/8 for altered mental status. Of note, her blood pressure was very high. Up to 231/102. Was on midodrine for low blood pressure. This was stopped. Suspect this was hypertensive emergency rather than the UTI that caused her symptoms.   Since then, donepezil and metformin had been stopped due to weight loss.   She lives in [facility], independent living. Has two live in aids.  She is receiving PT and OT. She uses rolling walker. Needs a new one. Has a fall alert necklace.  Receiving 100 mg of seroquel at night to help with sleep.  ____________________________________________________________________ 9/6/24  Last seen in clinic on 2/6/24. Doing okay. Ambulating with walker. Here to review results of MRI's. In general, no pain in her back. Just doesn't feel as steady on her feet. Tends to have to keep head down, feels less dizzy. No falling. Dizziness is worse upon standing. Drifts to one side or the other. Daughter will notice she shakes slightly and feels as if she is spinning. Wondering if this is vestibular? Did physical therapy, no clear benefit. During episodes of dizziness, checked loop recorder, did not appear to be cardiac. Has an appointment with Dr. Anthony to discuss spinal stenosis.   Daughter is considering since she is such a high fall risk - watchman procedure as opposed to eliquis.    _______________________ 2/6/24  Araceli is a 80-year-old right-handed woman with a past medical history of lumbar spinal stenosis, atrial fibrillation, CAD w/ three stents, type II diabetes, depression and hyperlipidemia.  She is presenting to clinic with daughter due to frequent falls. She states that she just falls without warning. No preceding chest pain. Not tripping on something. No dizziness. Sometimes if standing for too long, feels as if she needs to sit down.  Falls in stores. Last year, she hurt her knee and wrist. She goes forward with her upper body while rest of body appears to remain frozen. Daughter and mom want to know if they might be missing something so brought her to neurology.  She does have a loop recorder and they are looking for arrhythmias. Metoprolol was stopped due to bradycardia. Overall, she is doing relatively well. She has lost some weight for unclear reasons.   With regard to spinal stenosis, there is no pain.   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

## 2025-03-26 NOTE — DATA REVIEWED
[de-identified] : 1/2025: MRI brain: new punctate acute/subacute infarct in left posterior temporal region. Chronic ischemic changes . 4/2024 MRI brain:   BRAIN PARENCHYMAL VOLUME LOSS, PARTICULARLY PRONOUNCED IN THE MESIAL TEMPORAL LOBES.   3D VOLUMETRIC ASSESSMENT SUGGESTS SLIGHTLY LOW HIPPOCAMPAL VOLUME LOSS, AT 38TH PERCENTILE FOR AGE (LEFT HIPPOCAMPUS: 43RD PERCENTILE, RIGHT HIPPOCAMPUS: 34TH PERCENTILE).   IF THERE IS CONCERN FOR NEURODEGENERATIVE DISEASE, FDG-PET MAY BE PERFORMED TO EVALUATE FOR DIFFERENTIAL METABOLIC PATTERN OF NEURODEGENERATIVE DISORDERS. 2/5/2024: MRI brain: no intracranial mass, acute territorial infarct, intracranial hemorrhage or shift.  [de-identified] : 2/8/25 creatinine 1.2, wbc 11.87 high, INR 1.3  2/5/24: MRI lumbar spine: multilevel lumbar spondylosis  with S-shaped curvature and multilevel canal and foraminal stenosis - severe central canal stenosis at L2-L3 and L4-L4 and moderate canal stenosis at L3-L4. Severe right neural foraminal stenosis L2-3 and L3-4 and severe bilateral neural foraminal stenosis at L4-L5.

## 2025-04-24 NOTE — PHYSICAL EXAM
[No Respiratory Distress] : no respiratory distress [No Acc Muscle Use] : no accessory muscle use [Respiration, Rhythm And Depth] : normal respiratory rhythm and effort [Auscultation Breath Sounds / Voice Sounds] : lungs were clear to auscultation bilaterally

## 2025-04-24 NOTE — HISTORY OF PRESENT ILLNESS
[Designated Healthcare Proxy] : Designated healthcare proxy [Name: ___] : Health Care Proxy's Name: [unfilled]  [Relationship: ___] : Relationship: [unfilled] [I will adhere to the patient's wishes.] : I will adhere to the patient's wishes. [Time Spent: ___ minutes] : Time Spent: [unfilled] minutes [FreeTextEntry1] : Patient is a 81-year-old female with past medical history of mild dementia, A-fib, B12 deficiency, depression, CAD s/p 3 stents and a loop recorder, unintentional weight loss.  04/24/2025   - History of Present Illness: Currently stable, she is experiencing some incontinence but otherwise is doing well. She has started using a pull-up during the day and at night to manage this. There is a plan to reduce her Seroquel dosage in the next month. Discussions about advance care planning took place, broaching topics such as resuscitation measures, hospital transfers, feeding options, dialysis, and medication use in potential future emergency scenarios.  03/06/2025   - History of Present Illness: The patient is an elderly woman who was recently hospitalized for a urinary tract infection and dehydration. She was admitted to the ER due to altered mental status. During her hospital stay, she was diagnosed with a mild UTI and dehydration. She was treated with antibiotics (possibly Levaquin) and IV fluids. The patient's medications were adjusted during her hospital stay. She was discharged after a few days and has been doing well at home. The patient reports feeling much better since her hospitalization. She has a new live-in aide who is working out well. The patient's daughter reports that her mother's behavior has significantly improved since the treatment.    - Past Medical History: The patient has a history of orthostatic hypotension, for which she was previously prescribed midodrine. She also has a history of mood issues, for which she takes Seroquel and Lexapro. The patient recently had a stroke, which resulted in some vision impairment in her left eye. She has been seeing a retinal specialist for this issue and has shown significant improvement in her vision.  02/06/2025 history of recent stroke, presents with a sudden onset of confusion and decreased responsiveness. The symptoms began two days ago, with the patient experiencing difficulty in talking and increased confusion. The condition worsened significantly over the past 24 hours, with the patient becoming almost non-verbal and appearing uncomfortable. The patient's daughter reports that while there has been a gradual decline in her mother's condition, this acute change is unprecedented. The patient had an MRI last Saturday that revealed a small stroke, following a blood clot in her eye the week prior. Since then, her medication regimen was adjusted to include Eliquis 2.5 mg twice daily and a daily baby aspirin. The patient takes Seroquel 25 mg at night for management of confusion and agitation. There is no reported fever, but the patient feels cold to touch. The daughter also mentions a single instance of noticing a small amount of bright red blood on toilet paper after the patient used the bathroom on Saturday, but no recurrence since then.   01/14/2024   - History of Present Illness: Karime, an elderly female patient, has been experiencing increasingly severe delusions and sleep disturbances. She has been living in independent living at [facility] for two years. Recently, her symptoms have worsened significantly. She is up all night, confused about her location, paranoid, and angry. She believes she is being moved to different warehouses nightly and that people are stealing her belongings. The patient now requires 24-hour care due to the severity of her symptoms. Rexulti was previously prescribed but has not shown significant improvement. Initially, it caused morning drowsiness and dry mouth. The patient experienced a fall resulting in two broken ribs while on Rexulti. It is also very expensive.  Sleep patterns have deteriorated, with the patient only getting short periods of sleep (about an hour at a time) throughout the night. The patient has become hyper due to lack of sleep, which is unusual as she typically napped frequently during the day.      - Miralax three times a week for constipation management  12/05/2024 Confusion and memory issues - Experiences confusion and difficulty with memory, including trouble finding words and using incorrect words. - Episodes of confusion have been on and off, with increased frequency this week. - Does not remember episodes of confusion. - Underwent memory testing during the visit.  Orthostatic hypotension - Experiences orthostatic hypotension, with significant drops in blood pressure. - Currently taking midodrine, but it does not seem effective.  Mood and emotional state - Mood has improved with medication adjustments, less emotional and calmer in the past week or two. - Aware of confusion, which is upsetting. - Does not feel depressed currently, but gets overwhelmed easily.  Vision problems - Occasionally experiences cloudy vision in the left eye. - Last eye exam was two months ago (October 2024), which showed longstanding diabetes and heart issues but otherwise good eye health.  Physical and occupational therapy - Attends physical therapy for balance and strengthening. - Participates in occupational therapy, focusing on upper body and fine motor skills.  Living situation and daily assistance - Resides at [facility] with her . - Has a private pay aide seven days a week for assistance with daily activities due to instability and confusion.  Falls and balance issues - Has experienced falls, including a recent fall by the window, but has not been injured. Advanced care planning - Has not completed the pink form for advanced care planning. - Has legal documents for a living will but needs to complete healthcare proxy forms.  Cognitive careplan checklist summary: [facility] independent [facility] Memory assessment: 5/30 ADLs/IADLs: done PHQ-2, NPI-Q: done Med rec: done Home Safety: done Caregiver assessment: done ACP documentation: will fill at next visit FAST score: 6d Decision-making capacity: not intact for complex decisions  doing PT and OT          10/17/2024 Back Pain - Reports that the back pain seems to be getting less and improving - Experiences pain after physical therapy sessions - Pain is more manageable now compared to previous instances - Pain is not constant, goes through spurts - Pain seems to be a trigger for confusion episodes  Mood and Mental Health - Has been crying a lot and having days where she wakes up upset - Sleeps for about 10-11 hours, uses sleep as an escape from depression - Reports hallucinations and worsening confusion in the past couple of weeks - Reports feeling like she has been moved from three places and constantly looking for differences between them - Reports seeing things which are not there, such as a dog sitting next to her - Reports feeling really confused and disoriented, even in her own apartment - Reports feelings of paranoia and depression - Reports lack of relief when feeling uptight - Expresses feelings of hopelessness  Weight and Appetite - Weight has increased since last visit, currently weighs 111 lbs - Reports an increase in appetite  Medication and Treatment - Recently started on midodrine 2.5 mg three times a day for orthostatic hypotension, but no improvement noticed yet - Baby aspirin was recently stopped by cardiologist - metformin, calcium, and Aricept were stopped previously - Open to adding another antidepressant medication  Physical Therapy - Was attending physical therapy twice a week but stopped due to increased confusion and upset days - Finds physical therapy painful and not helpful - Interested in different types of therapy such as massage or bike exercises  Urinary Health - No burning sensation during urination - No increased frequency of urination    4AT score:  Burning urination: no Urine/fecal symptoms:  Medications: new midodrine withdrawl: no Pain: no; getting better Depression screen: no Sleep: less; 10-11hr Sensory deficits:   Level of activity:  Hydration: 32oz; not always Nutrition: increasing weight PE/MI:    Will check BMP,     09/06/2024 constipation: resolved ; taking miralax feels more depressed. tapered off fluoxetine; started lexapro 5mg 1 month ago.  she lives in Independent living.   eating very well now. now on 107lb. off donepezil, calcium, metformin. doing ensure once a day. balance is stil bit off. would try PT. hold off on memantine  seeing cardiologist next month. will discuss if aspirin can be discontinued.  orthostatic hypotension  aug 30,2024 Patient has not been feeling well since last 1 week not eating and drinking.  3 days ago she tested positive for COVID.  She also dropped off a urine sample yesterday as per daughter.  She has been feeling well better since yesterday afternoon and drinking 1 protein shake a day.  She had fever 100.3 on Monday but then it went away.  She also had runny nose which has gotten better.  Rarely has any cough but no sputum production.  Using Tylenol for headache and back pain. She has not had a bowel movement since Sunday.  Her labs from July showed a GFR of 41 and A1c of 6.2.  She is now off of fluoxetine and donepezil.  Has been taking Lexapro since last 1 week 5 mg daily.  SHOAIB HERBERT is a 81 year yo White female is coming in today to establish care. Patient has PMHx as listed below    #Depression long standing; was on fluoxetine 80mg and now on 60mg QD. 40 in AM and 20 in evening.  Sleeps 10-12hrs. always been a good sleeper.  #CAD s/p 3 stents with loop recorder on ASA + famotidine 20mg BID  #Afib on eliquis 2.5mg  BID, digoxin, not on metoprolol.  #B12 def on B12 3x a week  #Mild dementia on donepezil 5mg QD; will stop 2/2 weight loss  #unintentional weightloss started boost and ensure; using as needed.   did PT and stopped 1 month ago.    Education:[ ] Work:[ ] ETOH/Smoking:[ ] Weight loss/malnutrition:[ ] :[ ] Immunization: Screening: Depression screening: Medication reconciliation: Allergies:     4M Geriatric Screening Tests   Based on The 4Ms While Caring for Older Adults, an evidence-based focus on the key areas of mentation, mobility, medications, and what matters most (a guide by the [place] [employer] and the [facility] [facility])     Medications: -Anticholinergic burden:[ ]     Mobility:   -Chronic pain:[ ] -Constipation: -Urinary symptoms: IPSS score: Is prostate >30cc?, PSA >1.5? sexually active?     Frail Scale: 0/5   -Are you fatigued?[ ] -Can you walk up one flight of stairs?[ ] -Can you walk one block?[ ] -Do you have more than 5 illnesses?[ ] -Have you lost more than 5% of your weight in last 6 months?[ ]       Mentation: -Hx of dementia:[ ] -h/o delirium:[ ] -Cognitive enhancing agents:[ ]       Sleep: STOP-BANG Snoring:[ ] Tiredness:[ ] Observed Apnea:[ ] Pressure: High blood pressure[ ] BMI: higher than 35.[ ] Age: older than 50[ ] Neck Circumference: greater than 16 inches is considered a risk factor.[ ] Gender: Males[ ]     Matters Most     [FreeTextEntry4] :  Today  SHOAIB would like to discuss advance care planning.  We discussed the patient's desire for care and treatment if she were to face an end-of-life illness or an illness that affects her decision-making capabilities.  We also discussed what measures the patient would like to undergo if her heart were to stop beating and/or she is not able to breathe on her own. Who was present during the visit? patient, daughter What decisions were made? DNR ok with short term intubation, no feeding tube  or long term dialysis. What forms were completed/given to the patient? MOLST form

## 2025-04-24 NOTE — HISTORY OF PRESENT ILLNESS
[Designated Healthcare Proxy] : Designated healthcare proxy [Name: ___] : Health Care Proxy's Name: [unfilled]  [Relationship: ___] : Relationship: [unfilled] [I will adhere to the patient's wishes.] : I will adhere to the patient's wishes. [Time Spent: ___ minutes] : Time Spent: [unfilled] minutes [FreeTextEntry1] : Patient is a 81-year-old female with past medical history of mild dementia, A-fib, B12 deficiency, depression, CAD s/p 3 stents and a loop recorder, unintentional weight loss.  04/24/2025   - History of Present Illness: Currently stable, she is experiencing some incontinence but otherwise is doing well. She has started using a pull-up during the day and at night to manage this. There is a plan to reduce her Seroquel dosage in the next month. Discussions about advance care planning took place, broaching topics such as resuscitation measures, hospital transfers, feeding options, dialysis, and medication use in potential future emergency scenarios.  03/06/2025   - History of Present Illness: The patient is an elderly woman who was recently hospitalized for a urinary tract infection and dehydration. She was admitted to the ER due to altered mental status. During her hospital stay, she was diagnosed with a mild UTI and dehydration. She was treated with antibiotics (possibly Levaquin) and IV fluids. The patient's medications were adjusted during her hospital stay. She was discharged after a few days and has been doing well at home. The patient reports feeling much better since her hospitalization. She has a new live-in aide who is working out well. The patient's daughter reports that her mother's behavior has significantly improved since the treatment.    - Past Medical History: The patient has a history of orthostatic hypotension, for which she was previously prescribed midodrine. She also has a history of mood issues, for which she takes Seroquel and Lexapro. The patient recently had a stroke, which resulted in some vision impairment in her left eye. She has been seeing a retinal specialist for this issue and has shown significant improvement in her vision.  02/06/2025 history of recent stroke, presents with a sudden onset of confusion and decreased responsiveness. The symptoms began two days ago, with the patient experiencing difficulty in talking and increased confusion. The condition worsened significantly over the past 24 hours, with the patient becoming almost non-verbal and appearing uncomfortable. The patient's daughter reports that while there has been a gradual decline in her mother's condition, this acute change is unprecedented. The patient had an MRI last Saturday that revealed a small stroke, following a blood clot in her eye the week prior. Since then, her medication regimen was adjusted to include Eliquis 2.5 mg twice daily and a daily baby aspirin. The patient takes Seroquel 25 mg at night for management of confusion and agitation. There is no reported fever, but the patient feels cold to touch. The daughter also mentions a single instance of noticing a small amount of bright red blood on toilet paper after the patient used the bathroom on Saturday, but no recurrence since then.   01/14/2024   - History of Present Illness: Karime, an elderly female patient, has been experiencing increasingly severe delusions and sleep disturbances. She has been living in independent living at [facility] for two years. Recently, her symptoms have worsened significantly. She is up all night, confused about her location, paranoid, and angry. She believes she is being moved to different warehouses nightly and that people are stealing her belongings. The patient now requires 24-hour care due to the severity of her symptoms. Rexulti was previously prescribed but has not shown significant improvement. Initially, it caused morning drowsiness and dry mouth. The patient experienced a fall resulting in two broken ribs while on Rexulti. It is also very expensive.  Sleep patterns have deteriorated, with the patient only getting short periods of sleep (about an hour at a time) throughout the night. The patient has become hyper due to lack of sleep, which is unusual as she typically napped frequently during the day.      - Miralax three times a week for constipation management  12/05/2024 Confusion and memory issues - Experiences confusion and difficulty with memory, including trouble finding words and using incorrect words. - Episodes of confusion have been on and off, with increased frequency this week. - Does not remember episodes of confusion. - Underwent memory testing during the visit.  Orthostatic hypotension - Experiences orthostatic hypotension, with significant drops in blood pressure. - Currently taking midodrine, but it does not seem effective.  Mood and emotional state - Mood has improved with medication adjustments, less emotional and calmer in the past week or two. - Aware of confusion, which is upsetting. - Does not feel depressed currently, but gets overwhelmed easily.  Vision problems - Occasionally experiences cloudy vision in the left eye. - Last eye exam was two months ago (October 2024), which showed longstanding diabetes and heart issues but otherwise good eye health.  Physical and occupational therapy - Attends physical therapy for balance and strengthening. - Participates in occupational therapy, focusing on upper body and fine motor skills.  Living situation and daily assistance - Resides at [facility] with her . - Has a private pay aide seven days a week for assistance with daily activities due to instability and confusion.  Falls and balance issues - Has experienced falls, including a recent fall by the window, but has not been injured. Advanced care planning - Has not completed the pink form for advanced care planning. - Has legal documents for a living will but needs to complete healthcare proxy forms.  Cognitive careplan checklist summary: [facility] independent [facility] Memory assessment: 5/30 ADLs/IADLs: done PHQ-2, NPI-Q: done Med rec: done Home Safety: done Caregiver assessment: done ACP documentation: will fill at next visit FAST score: 6d Decision-making capacity: not intact for complex decisions  doing PT and OT          10/17/2024 Back Pain - Reports that the back pain seems to be getting less and improving - Experiences pain after physical therapy sessions - Pain is more manageable now compared to previous instances - Pain is not constant, goes through spurts - Pain seems to be a trigger for confusion episodes  Mood and Mental Health - Has been crying a lot and having days where she wakes up upset - Sleeps for about 10-11 hours, uses sleep as an escape from depression - Reports hallucinations and worsening confusion in the past couple of weeks - Reports feeling like she has been moved from three places and constantly looking for differences between them - Reports seeing things which are not there, such as a dog sitting next to her - Reports feeling really confused and disoriented, even in her own apartment - Reports feelings of paranoia and depression - Reports lack of relief when feeling uptight - Expresses feelings of hopelessness  Weight and Appetite - Weight has increased since last visit, currently weighs 111 lbs - Reports an increase in appetite  Medication and Treatment - Recently started on midodrine 2.5 mg three times a day for orthostatic hypotension, but no improvement noticed yet - Baby aspirin was recently stopped by cardiologist - metformin, calcium, and Aricept were stopped previously - Open to adding another antidepressant medication  Physical Therapy - Was attending physical therapy twice a week but stopped due to increased confusion and upset days - Finds physical therapy painful and not helpful - Interested in different types of therapy such as massage or bike exercises  Urinary Health - No burning sensation during urination - No increased frequency of urination    4AT score:  Burning urination: no Urine/fecal symptoms:  Medications: new midodrine withdrawl: no Pain: no; getting better Depression screen: no Sleep: less; 10-11hr Sensory deficits:   Level of activity:  Hydration: 32oz; not always Nutrition: increasing weight PE/MI:    Will check BMP,     09/06/2024 constipation: resolved ; taking miralax feels more depressed. tapered off fluoxetine; started lexapro 5mg 1 month ago.  she lives in Independent living.   eating very well now. now on 107lb. off donepezil, calcium, metformin. doing ensure once a day. balance is stil bit off. would try PT. hold off on memantine  seeing cardiologist next month. will discuss if aspirin can be discontinued.  orthostatic hypotension  aug 30,2024 Patient has not been feeling well since last 1 week not eating and drinking.  3 days ago she tested positive for COVID.  She also dropped off a urine sample yesterday as per daughter.  She has been feeling well better since yesterday afternoon and drinking 1 protein shake a day.  She had fever 100.3 on Monday but then it went away.  She also had runny nose which has gotten better.  Rarely has any cough but no sputum production.  Using Tylenol for headache and back pain. She has not had a bowel movement since Sunday.  Her labs from July showed a GFR of 41 and A1c of 6.2.  She is now off of fluoxetine and donepezil.  Has been taking Lexapro since last 1 week 5 mg daily.  SHOAIB HERBERT is a 81 year yo White female is coming in today to establish care. Patient has PMHx as listed below    #Depression long standing; was on fluoxetine 80mg and now on 60mg QD. 40 in AM and 20 in evening.  Sleeps 10-12hrs. always been a good sleeper.  #CAD s/p 3 stents with loop recorder on ASA + famotidine 20mg BID  #Afib on eliquis 2.5mg  BID, digoxin, not on metoprolol.  #B12 def on B12 3x a week  #Mild dementia on donepezil 5mg QD; will stop 2/2 weight loss  #unintentional weightloss started boost and ensure; using as needed.   did PT and stopped 1 month ago.    Education:[ ] Work:[ ] ETOH/Smoking:[ ] Weight loss/malnutrition:[ ] :[ ] Immunization: Screening: Depression screening: Medication reconciliation: Allergies:     4M Geriatric Screening Tests   Based on The 4Ms While Caring for Older Adults, an evidence-based focus on the key areas of mentation, mobility, medications, and what matters most (a guide by the [employer] and the [place]. [facility], [place])     Medications: -Anticholinergic burden:[ ]     Mobility:   -Chronic pain:[ ] -Constipation: -Urinary symptoms: IPSS score: Is prostate >30cc?, PSA >1.5? sexually active?     Frail Scale: 0/5   -Are you fatigued?[ ] -Can you walk up one flight of stairs?[ ] -Can you walk one block?[ ] -Do you have more than 5 illnesses?[ ] -Have you lost more than 5% of your weight in last 6 months?[ ]       Mentation: -Hx of dementia:[ ] -h/o delirium:[ ] -Cognitive enhancing agents:[ ]       Sleep: STOP-BANG Snoring:[ ] Tiredness:[ ] Observed Apnea:[ ] Pressure: High blood pressure[ ] BMI: higher than 35.[ ] Age: older than 50[ ] Neck Circumference: greater than 16 inches is considered a risk factor.[ ] Gender: Males[ ]     Matters Most     [FreeTextEntry4] :  Today  SHOAIB would like to discuss advance care planning.  We discussed the patient's desire for care and treatment if she were to face an end-of-life illness or an illness that affects her decision-making capabilities.  We also discussed what measures the patient would like to undergo if her heart were to stop beating and/or she is not able to breathe on her own. Who was present during the visit? patient, daughter What decisions were made? DNR ok with short term intubation, no feeding tube  or long term dialysis. What forms were completed/given to the patient? MOLST form

## 2025-07-10 NOTE — HISTORY OF PRESENT ILLNESS
[FreeTextEntry8] : Since Sunday HHA noticed increase urinary frequency and patient did complain of dysuria.  [Formal Caregiver] : formal caregiver

## 2025-07-10 NOTE — PHYSICAL EXAM
[No Acute Distress] : no acute distress [No Respiratory Distress] : no respiratory distress  [Normal Rate] : normal rate  [Normal S1, S2] : normal S1 and S2 [Soft] : abdomen soft [Non Tender] : non-tender [Normal Bowel Sounds] : normal bowel sounds